# Patient Record
Sex: MALE | Race: WHITE
[De-identification: names, ages, dates, MRNs, and addresses within clinical notes are randomized per-mention and may not be internally consistent; named-entity substitution may affect disease eponyms.]

---

## 2018-05-09 ENCOUNTER — HOSPITAL ENCOUNTER (EMERGENCY)
Dept: HOSPITAL 47 - EC | Age: 23
Discharge: HOME | End: 2018-05-09
Payer: COMMERCIAL

## 2018-05-09 VITALS
RESPIRATION RATE: 16 BRPM | SYSTOLIC BLOOD PRESSURE: 176 MMHG | HEART RATE: 79 BPM | TEMPERATURE: 98.5 F | DIASTOLIC BLOOD PRESSURE: 90 MMHG

## 2018-05-09 DIAGNOSIS — B86: Primary | ICD-10-CM

## 2018-05-09 DIAGNOSIS — Z79.899: ICD-10-CM

## 2018-05-09 DIAGNOSIS — F17.200: ICD-10-CM

## 2018-05-09 PROCEDURE — 99282 EMERGENCY DEPT VISIT SF MDM: CPT

## 2018-05-09 NOTE — ED
General Adult HPI





- General


Chief complaint: Skin/Abscess/Foreign Body


Stated complaint: Rash


Time Seen by Provider: 05/09/18 08:47


Source: patient, RN notes reviewed


Mode of arrival: ambulatory


Limitations: no limitations





- History of Present Illness


Initial comments: 





Patient 23-year-old male presented to the emergency room today with a rash 

times one.  He states it started on his left hand but she was sides.  States 

very itchy.  States he's been to multiple doctors been on steroids also topical 

creams.  He states he was treated with permethrin but did not repeat the 

treatment in 7-10 days.  He does not that he seemed like this cream did help 

him the most.  Patient denies any other complaints or symptoms. Patient denies 

any recent fever, chills, shortness of breath, chest pain, back pain, abdominal 

pain, nausea or vomiting, numbness or tingling, dysuria or hematuria, 

constipation or diarrhea, headaches or visual changes, or any other complaints.





- Related Data


 Home Medications











 Medication  Instructions  Recorded  Confirmed


 


Cetirizine HCl [Zyrtec] 10 mg PO DAILY 05/09/18 05/09/18








 Previous Rx's











 Medication  Instructions  Recorded


 


Permethrin 5% Cream [Elimite] 1 applic TOPICAL ONCE #1 tube 05/09/18


 


hydrOXYzine HCL [Atarax] 1 - 2 tab PO TID PRN #30 tab 05/09/18











 Allergies











Allergy/AdvReac Type Severity Reaction Status Date / Time


 


No Known Allergies Allergy   Verified 05/09/18 08:46














Review of Systems


ROS Statement: 


Those systems with pertinent positive or pertinent negative responses have been 

documented in the HPI.





ROS Other: All systems not noted in ROS Statement are negative.





Past Medical History


Past Medical History: No Reported History


History of Any Multi-Drug Resistant Organisms: None Reported


Additional Past Surgical History / Comment(s): Right wrist surgery


Past Psychological History: Anxiety, Depression


Smoking Status: Current every day smoker


Past Alcohol Use History: Occasional


Past Drug Use History: Marijuana





General Exam





- General Exam Comments


Initial Comments: 





General:  The patient is awake and alert, in no distress, and does not appear 

acutely ill. 


Eye:  Pupils are equal, round and reactive to light, extra-ocular movements are 

intact.  No nystagmus.  There is normal conjunctiva bilaterally.  No signs of 

icterus.  


Ears, nose, mouth and throat:  There are moist mucous membranes and no oral 

lesions. 


Neck:  The neck is supple, there is no tenderness or JVD.  


Musculoskeletal:  Normal ROM, no tenderness.  Strength 5/5. Sensation intact. 

Pulses equal bilaterally 2+.  


Neurological:  A&O x 3. CN II-XII intact, There are no obvious motor or sensory 

deficits. Coordination appears grossly intact. Speech is normal.


Skin: Patient does have maculopapular rash starting to the left hand 

posteriorly with a few spots seen on the arms and also the sides of the trunk 

both left and right.


Psychiatric:  Cooperative, appropriate mood & affect, normal judgment.  


Limitations: no limitations





Course





 Vital Signs











  05/09/18





  08:30


 


Temperature 98.5 F


 


Pulse Rate 79


 


Respiratory 16





Rate 


 


Blood Pressure 176/90


 


O2 Sat by Pulse 99





Oximetry 














Medical Decision Making





- Medical Decision Making





Patient will be retreated with permethrin cream.  He is advised to repeat the 

treatment in 7-10 days as it does not kill any of the eggs.  Advised that if 

this does not help his symptoms to follow-up in dermatology for further testing.





Disposition


Clinical Impression: 


 Scabies





Disposition: HOME SELF-CARE


Condition: Good


Instructions:  Scabies (ED)


Additional Instructions: 


Please repeat treatment in 7-10 days as discussed.  Please follow-up 

dermatology if there is no improvement for further evaluation.


Prescriptions: 


hydrOXYzine HCL [Atarax] 1 - 2 tab PO TID PRN #30 tab


 PRN Reason: Itching


Permethrin 5% Cream [Elimite] 1 applic TOPICAL ONCE #1 tube


Is patient prescribed a controlled substance at d/c from ED?: No


Referrals: 


Min Alcocer MD [Primary Care Provider] - 1-2 days


Agustín Nava MD [STAFF PHYSICIAN] - 1-2 days


Time of Disposition: 09:01

## 2020-09-08 ENCOUNTER — HOSPITAL ENCOUNTER (OUTPATIENT)
Dept: HOSPITAL 47 - SLEEP | Age: 25
Discharge: HOME | End: 2020-09-08
Attending: INTERNAL MEDICINE
Payer: COMMERCIAL

## 2020-09-08 DIAGNOSIS — G47.33: Primary | ICD-10-CM

## 2020-09-08 DIAGNOSIS — Z99.89: ICD-10-CM

## 2020-09-08 NOTE — PN
PROGRESS NOTE



Matt is coming in for a compliance check regarding obstructive sleep apnea.  The

patient was diagnosed having JOSE E based on a home sleep study that was done few months

back. The patient was found to have moderate to severe disease with an AHI of 22.  He

is currently utilizing APAP at a minimum pressure of 5, maximum pressure of 15.  He is

feeling better, much more refreshed and alert during the day.  He is very content with

the ongoing treatment.  Based on the compliance data, the patient has been achieving

more than 4 hours of CPAP use around 66% of the time with an average of 4.8 hours per

night.  His average CPAP pressure is around 14.7.  His AHI is down to 4.4.  He is using

an AirFit F20 medium-sized full-face mask.  He has no complaints.  No chest pain,

shortness of breath or heartburn or gastric distention.  Ellenton score is 13.



REVIEW OF SYSTEMS:

Fourteen-point review of system was done. Positive findings are all mentioned above in

the history of present illness.



PHYSICAL EXAMINATION:

BP is 133/80, pulse 82, respirations 16, temperature 98.5. Weight is 182, saturation

97% on room air.

GENERAL APPEARANCE:  Calm, comfortable.

HEAD: Atraumatic, normocephalic.

NECK:  Supple.  No JVD.  No goiter or neck masses.

LUNGS:  Clear to auscultation.

HEART:  Heart sounds are regular rate and rhythm.  Normal S1, S2.  No S3, S4.  No

murmurs.

ABDOMEN:  Soft, nontender.  No organomegaly.

EXTREMITIES:  No edema.  No cyanosis or clubbing.



IMPRESSION:

1. Obstructive sleep apnea, moderate to severe, AHI of 22, currently undergoing

    successful treatment with APAP.

2. Hypersomnia, improved.

3. Snoring, recovered.



PLAN:

1. Continue APAP therapy at the same level of pressure.

2. Keep the same mask interface.  Treatment is successful for now.  No need for any

    further adjustments.  See me back in a year's time.





MMODL / IJN: [id number] / Job#: [id number]

## 2021-08-01 ENCOUNTER — HOSPITAL ENCOUNTER (EMERGENCY)
Dept: HOSPITAL 47 - EC | Age: 26
Discharge: HOME | End: 2021-08-01
Payer: COMMERCIAL

## 2021-08-01 VITALS
TEMPERATURE: 98 F | SYSTOLIC BLOOD PRESSURE: 129 MMHG | HEART RATE: 91 BPM | DIASTOLIC BLOOD PRESSURE: 77 MMHG | RESPIRATION RATE: 16 BRPM
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
## 2021-08-01 DIAGNOSIS — J45.909: ICD-10-CM

## 2021-08-01 DIAGNOSIS — S81.832A: Primary | ICD-10-CM

## 2021-08-01 DIAGNOSIS — F17.200: ICD-10-CM

## 2021-08-01 DIAGNOSIS — Z23: ICD-10-CM

## 2021-08-01 DIAGNOSIS — S81.812A: ICD-10-CM

## 2021-08-01 PROCEDURE — 73590 X-RAY EXAM OF LOWER LEG: CPT

## 2021-08-01 PROCEDURE — 99283 EMERGENCY DEPT VISIT LOW MDM: CPT

## 2021-08-01 PROCEDURE — 90715 TDAP VACCINE 7 YRS/> IM: CPT

## 2021-08-01 PROCEDURE — 90471 IMMUNIZATION ADMIN: CPT

## 2021-08-01 PROCEDURE — 12002 RPR S/N/AX/GEN/TRNK2.6-7.5CM: CPT

## 2021-08-01 NOTE — XR
EXAMINATION TYPE: XR tibia fibula LT

 

DATE OF EXAM: 8/1/2021

 

COMPARISON: NONE

 

HISTORY: Pain

 

TECHNIQUE:  Two views are submitted.

 

FINDINGS:

The osseous structures are intact.  The joint spaces are preserved.  Tiny plantar calcaneal spur.

 

IMPRESSION: 

1. No acute osseous abnormality.
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
